# Patient Record
Sex: FEMALE | Race: WHITE | Employment: FULL TIME | URBAN - NONMETROPOLITAN AREA
[De-identification: names, ages, dates, MRNs, and addresses within clinical notes are randomized per-mention and may not be internally consistent; named-entity substitution may affect disease eponyms.]

---

## 2024-10-14 PROBLEM — Z85.828 HISTORY OF SKIN CANCER: Status: ACTIVE | Noted: 2024-10-14

## 2024-10-14 PROBLEM — R92.2 INCONCLUSIVE MAMMOGRAPHY DUE TO DENSE BREASTS: Status: ACTIVE | Noted: 2024-10-14

## 2024-10-14 PROBLEM — R92.30 INCONCLUSIVE MAMMOGRAPHY DUE TO DENSE BREASTS: Status: ACTIVE | Noted: 2024-10-14

## 2024-10-15 ENCOUNTER — HOSPITAL ENCOUNTER (OUTPATIENT)
Age: 44
Discharge: HOME | End: 2024-10-15
Payer: COMMERCIAL

## 2024-10-15 DIAGNOSIS — Z72.820 POOR SLEEP: ICD-10-CM

## 2024-10-15 DIAGNOSIS — Z00.00 WELL ADULT EXAM: ICD-10-CM

## 2024-10-15 LAB
25(OH)D3 SERPL-MCNC: 36 NG/ML (ref 30–100)
ALBUMIN SERPL BCG-MCNC: 4.3 G/DL (ref 3.5–5.1)
ALP SERPL-CCNC: 58 U/L (ref 38–126)
ALT SERPL W/O P-5'-P-CCNC: 10 U/L (ref 11–66)
ANION GAP SERPL CALC-SCNC: 12 MEQ/L (ref 8–16)
AST SERPL-CCNC: 14 U/L (ref 5–40)
BASOPHILS ABSOLUTE: 0.1 THOU/MM3 (ref 0–0.1)
BASOPHILS NFR BLD AUTO: 1 %
BILIRUB SERPL-MCNC: 0.4 MG/DL (ref 0.3–1.2)
BUN SERPL-MCNC: 9 MG/DL (ref 7–22)
CALCIUM SERPL-MCNC: 9.1 MG/DL (ref 8.5–10.5)
CHLORIDE SERPL-SCNC: 105 MEQ/L (ref 98–111)
CHOLEST SERPL-MCNC: 162 MG/DL (ref 100–199)
CO2 SERPL-SCNC: 23 MEQ/L (ref 23–33)
CREAT SERPL-MCNC: 0.5 MG/DL (ref 0.4–1.2)
DEPRECATED RDW RBC AUTO: 41.8 FL (ref 35–45)
EOSINOPHIL NFR BLD AUTO: 7.8 %
EOSINOPHILS ABSOLUTE: 0.5 THOU/MM3 (ref 0–0.4)
ERYTHROCYTE [DISTWIDTH] IN BLOOD BY AUTOMATED COUNT: 12.7 % (ref 11.5–14.5)
GFR SERPL CREATININE-BSD FRML MDRD: > 90 ML/MIN/1.73M2
GLUCOSE SERPL-MCNC: 86 MG/DL (ref 70–108)
HCT VFR BLD AUTO: 41.9 % (ref 37–47)
HDLC SERPL-MCNC: 53 MG/DL
HGB BLD-MCNC: 14 GM/DL (ref 12–16)
IMM GRANULOCYTES # BLD AUTO: 0.01 THOU/MM3 (ref 0–0.07)
IMM GRANULOCYTES NFR BLD AUTO: 0.2 %
IRON SERPL-MCNC: 127 UG/DL (ref 50–170)
LDLC SERPL CALC-MCNC: 96 MG/DL
LYMPHOCYTES ABSOLUTE: 1.6 THOU/MM3 (ref 1–4.8)
LYMPHOCYTES NFR BLD AUTO: 26.9 %
MCH RBC QN AUTO: 30.1 PG (ref 26–33)
MCHC RBC AUTO-ENTMCNC: 33.4 GM/DL (ref 32.2–35.5)
MCV RBC AUTO: 90.1 FL (ref 81–99)
MONOCYTES ABSOLUTE: 0.4 THOU/MM3 (ref 0.4–1.3)
MONOCYTES NFR BLD AUTO: 7.3 %
NEUTROPHILS ABSOLUTE: 3.4 THOU/MM3 (ref 1.8–7.7)
NEUTROPHILS NFR BLD AUTO: 56.8 %
NRBC BLD AUTO-RTO: 0 /100 WBC
PLATELET # BLD AUTO: 299 THOU/MM3 (ref 130–400)
PMV BLD AUTO: 9.3 FL (ref 9.4–12.4)
POTASSIUM SERPL-SCNC: 3.4 MEQ/L (ref 3.5–5.2)
PROT SERPL-MCNC: 7.7 G/DL (ref 6.1–8)
RBC # BLD AUTO: 4.65 MILL/MM3 (ref 4.2–5.4)
SODIUM SERPL-SCNC: 140 MEQ/L (ref 135–145)
TIBC SERPL-MCNC: 238 UG/DL (ref 171–450)
TRIGL SERPL-MCNC: 67 MG/DL (ref 0–199)
TSH SERPL DL<=0.005 MIU/L-ACNC: 1.55 UIU/ML (ref 0.4–4.2)
WBC # BLD AUTO: 5.9 THOU/MM3 (ref 4.8–10.8)

## 2024-10-15 PROCEDURE — 83540 ASSAY OF IRON: CPT

## 2024-10-15 PROCEDURE — 80061 LIPID PANEL: CPT

## 2024-10-15 PROCEDURE — 82306 VITAMIN D 25 HYDROXY: CPT

## 2024-10-15 PROCEDURE — 85025 COMPLETE CBC W/AUTO DIFF WBC: CPT

## 2024-10-15 PROCEDURE — 36415 COLL VENOUS BLD VENIPUNCTURE: CPT

## 2024-10-15 PROCEDURE — 80053 COMPREHEN METABOLIC PANEL: CPT

## 2024-10-15 PROCEDURE — 84443 ASSAY THYROID STIM HORMONE: CPT

## 2024-10-15 PROCEDURE — 83550 IRON BINDING TEST: CPT

## 2024-11-05 ENCOUNTER — HOSPITAL ENCOUNTER (OUTPATIENT)
Dept: WOMENS IMAGING | Age: 44
Discharge: HOME | End: 2024-11-05
Payer: COMMERCIAL

## 2024-11-05 DIAGNOSIS — R92.2 INCONCLUSIVE MAMMOGRAPHY DUE TO DENSE BREASTS: ICD-10-CM

## 2024-11-05 DIAGNOSIS — R92.30 INCONCLUSIVE MAMMOGRAPHY DUE TO DENSE BREASTS: ICD-10-CM

## 2024-11-05 PROCEDURE — 76641 ULTRASOUND BREAST COMPLETE: CPT

## 2024-11-06 ENCOUNTER — HOSPITAL ENCOUNTER (OUTPATIENT)
Dept: WOMENS IMAGING | Age: 44
Discharge: HOME | End: 2024-11-06
Attending: RADIOLOGY

## 2024-11-06 DIAGNOSIS — Z00.6 ENCOUNTER FOR EXAMINATION FOR NORMAL COMPARISON OR CONTROL IN CLINICAL RESEARCH PROGRAM: ICD-10-CM

## 2024-12-13 ENCOUNTER — APPOINTMENT (OUTPATIENT)
Dept: GENERAL RADIOLOGY | Age: 44
End: 2024-12-13
Attending: FAMILY MEDICINE
Payer: COMMERCIAL

## 2024-12-13 ENCOUNTER — HOSPITAL ENCOUNTER (EMERGENCY)
Age: 44
Discharge: HOME | End: 2024-12-13
Attending: FAMILY MEDICINE
Payer: COMMERCIAL

## 2024-12-13 VITALS
WEIGHT: 120 LBS | HEIGHT: 64 IN | SYSTOLIC BLOOD PRESSURE: 131 MMHG | BODY MASS INDEX: 20.49 KG/M2 | DIASTOLIC BLOOD PRESSURE: 90 MMHG | RESPIRATION RATE: 16 BRPM | OXYGEN SATURATION: 99 % | TEMPERATURE: 97.4 F | HEART RATE: 104 BPM

## 2024-12-13 DIAGNOSIS — M79.81 ACHENBACH'S SYNDROME: Primary | ICD-10-CM

## 2024-12-13 PROCEDURE — 99283 EMERGENCY DEPT VISIT LOW MDM: CPT

## 2024-12-13 PROCEDURE — 73130 X-RAY EXAM OF HAND: CPT

## 2024-12-13 ASSESSMENT — ENCOUNTER SYMPTOMS
VOMITING: 0
NAUSEA: 0

## 2024-12-13 ASSESSMENT — PAIN - FUNCTIONAL ASSESSMENT: PAIN_FUNCTIONAL_ASSESSMENT: 0-10

## 2024-12-13 ASSESSMENT — PAIN DESCRIPTION - LOCATION: LOCATION: HAND

## 2024-12-13 NOTE — ED PROVIDER NOTES
[facility]  eMERGENCY dEPARTMENT eNCOUnter          CHIEF COMPLAINT       Chief Complaint   Patient presents with    Finger bruising       Nurses Notes reviewed and I agree except as noted in the HPI.      HISTORY OF PRESENT ILLNESS    Ciara Patel is a 43 y.o. female who presents with right middle finger bruising. Patient notes symptoms have occurred before. Patient denies trauma. Denies pain. Notes prior occurrence has occurred before. Denies other hand or arm issues.          REVIEW OF SYSTEMS     Review of Systems   Cardiovascular:  Negative for chest pain and palpitations.   Gastrointestinal:  Negative for nausea and vomiting.   Skin:         Bruising noted to palmar aspect of right middle finger    Neurological:  Negative for weakness and numbness.   Hematological:  Does not bruise/bleed easily.         PAST MEDICAL HISTORY    has a past medical history of Skin cancer.    SURGICAL HISTORY      has a past surgical history that includes Adenoidectomy w/Tympanostomy Tube Placem; Breast lumpectomy (Right, 2002); and Gum surgery (2022).    CURRENT MEDICATIONS       Previous Medications    CYCLOSPORINE (RESTASIS) 0.05 % OPHTHALMIC EMULSION    1 drop 2 times daily    LOW-OGESTREL 0.3-30 MG-MCG PER TABLET    Take 1 tablet by mouth daily       ALLERGIES     has No Known Allergies.    FAMILY HISTORY     She indicated that the status of her maternal aunt is unknown. She indicated that the status of her maternal cousin is unknown.   family history includes Breast Cancer (age of onset: 40 - 49) in her maternal cousin; Breast Cancer (age of onset: 70 - 79) in her maternal aunt.    SOCIAL HISTORY      reports that she has never smoked. She has never used smokeless tobacco. She reports current alcohol use. She reports that she does not use drugs.    PHYSICAL EXAM     INITIAL VITALS:  height is 1.626 m (5' 4\") and weight is 54.4 kg (120 lb). Her tympanic temperature is 97.4 °F (36.3 °C). Her blood

## 2024-12-13 NOTE — DISCHARGE INSTR - COC
Continuity of Care Form    Patient Name: Ciara Patel   :  1980  MRN:  [id number]    Admit date:  2024  Discharge date:  ***    Code Status Order: No Order   Advance Directives:   Advance Care Flowsheet Documentation             Admitting Physician:  No admitting provider for patient encounter.  PCP: Katie Tran, SIRIA - CNP    Discharging Nurse: ***  Discharging Hospital Unit/Room#: E1/E1  Discharging Unit Phone Number: ***    Emergency Contact:   Extended Emergency Contact Information  Primary Emergency Contact: JERO WEEMS  Home Phone: [phone]  Mobile Phone: [phone]  Relation: Parent    Past Surgical History:  Past Surgical History:   Procedure Laterality Date    ADENOIDECTOMY AND TYMPANOSTOMY TUBE PLACEMENT      as a child    BREAST LUMPECTOMY Right     fibroadenoma    GUM SURGERY      grafting       Immunization History:   Immunization History   Administered Date(s) Administered    COVID-19, MODERNA BLUE border, Primary or Immunocompromised, (age 12y+), IM, 100 mcg/0.5mL 2021, 2021    COVID-19, MODERNA Booster BLUE border, (age 18y+), IM, 50mcg/0.25mL 2021       Active Problems:  Patient Active Problem List   Diagnosis Code    History of skin cancer Z85.828    Inconclusive mammography due to dense breasts R92.2, R92.30       Isolation/Infection:   Isolation            No Isolation          Patient Infection Status       None to display            Nurse Assessment:  Last Vital Signs: BP (!) 131/90   Pulse (!) 104   Temp 97.4 °F (36.3 °C) (Tympanic)   Resp 16   Ht 1.626 m (5' 4\")   Wt 54.4 kg (120 lb)   LMP 2024   SpO2 99%   BMI 20.60 kg/m²     Last documented pain score (0-10 scale):    Last Weight:   Wt Readings from Last 1 Encounters:   24 54.4 kg (120 lb)     Mental Status:  {IP PT MENTAL STATUS:}    IV Access:  {[facility] IV ACCESS:209749457}    Nursing Mobility/ADLs:  Walking   {[facility] DME  applicable)   Name:  Address:  Dialysis Schedule:  Phone:  Fax:    / signature: {Esignature:018643680}    PHYSICIAN SECTION    Prognosis: {Prognosis:7241153705}    Condition at Discharge: { Patient Condition:719530646}    Rehab Potential (if transferring to Rehab): {Prognosis:2291895999}    Recommended Labs or Other Treatments After Discharge: ***    Physician Certification: I certify the above information and transfer of Ciara Patel  is necessary for the continuing treatment of the diagnosis listed and that she requires {Admit to Appropriate Level of Care:93020} for {GREATER/LESS:854945946} 30 days.     Update Admission H&P: {[facility] DME Changes in HandP:773741653}    PHYSICIAN SIGNATURE:  {Esignature:888615429}

## 2024-12-13 NOTE — DISCHARGE INSTRUCTIONS
Keep fingers warm. If worsening pain or other fingers become involved than return to ED or follow up with PCP.

## 2024-12-13 NOTE — ED NOTES
Pt complains of  r middle finger bruising. Pt denies any injury to finger. Pt states her finger will bruise and then heal up and then bruise again. Pt able to bend finger, radial pulse strong and regular. Pt alert, resp even and unlabored, skin pink, warm and dry.

## 2024-12-20 ENCOUNTER — HOSPITAL ENCOUNTER (OUTPATIENT)
Age: 44
End: 2024-12-20

## 2024-12-24 ENCOUNTER — HOSPITAL ENCOUNTER (OUTPATIENT)
Age: 44
Discharge: HOME | End: 2024-12-24
Payer: COMMERCIAL

## 2024-12-24 DIAGNOSIS — M79.81 ACHENBACH'S SYNDROME: ICD-10-CM

## 2024-12-24 DIAGNOSIS — L81.9 DISCOLORATION OF SKIN OF FINGER: ICD-10-CM

## 2024-12-24 LAB
FOLATE SERPL-MCNC: > 20 NG/ML (ref 4.8–24.2)
VIT B12 SERPL-MCNC: 590 PG/ML (ref 211–911)

## 2024-12-24 PROCEDURE — 82607 VITAMIN B-12: CPT

## 2024-12-24 PROCEDURE — 82746 ASSAY OF FOLIC ACID SERUM: CPT

## 2024-12-24 PROCEDURE — 36415 COLL VENOUS BLD VENIPUNCTURE: CPT

## 2024-12-24 PROCEDURE — 84207 ASSAY OF VITAMIN B-6: CPT

## 2024-12-27 LAB — PYRIDOXAL PHOS SERPL-SCNC: 73.8 NMOL/L (ref 20–125)

## 2025-03-18 ENCOUNTER — OFFICE VISIT (OUTPATIENT)
Age: 45
End: 2025-03-18
Payer: COMMERCIAL

## 2025-03-18 VITALS
DIASTOLIC BLOOD PRESSURE: 72 MMHG | HEIGHT: 65 IN | WEIGHT: 118 LBS | SYSTOLIC BLOOD PRESSURE: 113 MMHG | BODY MASS INDEX: 19.66 KG/M2 | HEART RATE: 80 BPM

## 2025-03-18 DIAGNOSIS — L81.9 DISCOLORATION OF SKIN OF FINGER: ICD-10-CM

## 2025-03-18 DIAGNOSIS — I73.00 RAYNAUD'S DISEASE WITHOUT GANGRENE: Primary | ICD-10-CM

## 2025-03-18 PROCEDURE — 99203 OFFICE O/P NEW LOW 30 MIN: CPT | Performed by: SURGERY

## 2025-03-18 RX ORDER — ASPIRIN 81 MG/1
81 TABLET ORAL DAILY
Qty: 90 TABLET | Refills: 0 | Status: SHIPPED | OUTPATIENT
Start: 2025-03-18

## 2025-03-18 ASSESSMENT — ENCOUNTER SYMPTOMS
CHEST TIGHTNESS: 0
SHORTNESS OF BREATH: 0
ABDOMINAL PAIN: 0
WHEEZING: 0

## 2025-03-18 NOTE — PROGRESS NOTES
duplex upper extremity arteries bilateral      2. Discoloration of skin of finger  aspirin 81 MG EC tablet          - Will obtain a bilateral upper extremity arterial doppler study with cold immersion testing. Will obtain an EKG and echocardiogram. She can start aspirin 81 mg PO daily; she does not need amlodipine or NTG paste at this point as she has no symptoms and her BP is normal. Discussed with the patient. I instructed her to keep her hands warm, although it does not necessarily appear that cold stimulated the incidents.  RTC after studies.      Christiano Ovalles MD   3/18/2025      Total time spent managing and seeing the patient was 35 minutes

## 2025-03-18 NOTE — PATIENT INSTRUCTIONS
If you receive a survey asking about your care experience, please respond. Your answers will help ensure you receive high-quality care at this office. Thank you!    Your Medical Assistant today: Romina JULIEN  Thank you for coming to our office! It was a pleasure to serve you.

## 2025-03-27 ENCOUNTER — HOSPITAL ENCOUNTER (OUTPATIENT)
Age: 45
Discharge: HOME | End: 2025-03-27
Payer: COMMERCIAL

## 2025-03-27 ENCOUNTER — HOSPITAL ENCOUNTER (OUTPATIENT)
Dept: GENERAL RADIOLOGY | Age: 45
Discharge: HOME | End: 2025-03-27
Payer: COMMERCIAL
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
## 2025-03-27 DIAGNOSIS — M25.551 RIGHT HIP PAIN: ICD-10-CM

## 2025-03-27 LAB
EKG ATRIAL RATE: 72 BPM
EKG P AXIS: 92 DEGREES
EKG P-R INTERVAL: 160 MS
EKG Q-T INTERVAL: 370 MS
EKG QRS DURATION: 82 MS
EKG QTC CALCULATION (BAZETT): 405 MS
EKG R AXIS: -7 DEGREES
EKG T AXIS: 42 DEGREES
EKG VENTRICULAR RATE: 72 BPM

## 2025-03-27 PROCEDURE — 93005 ELECTROCARDIOGRAM TRACING: CPT | Performed by: SURGERY

## 2025-03-27 PROCEDURE — 73502 X-RAY EXAM HIP UNI 2-3 VIEWS: CPT

## 2025-04-03 ENCOUNTER — HOSPITAL ENCOUNTER (OUTPATIENT)
Dept: INTERVENTIONAL RADIOLOGY/VASCULAR | Age: 45
Discharge: HOME | End: 2025-04-05
Attending: SURGERY
Payer: COMMERCIAL

## 2025-04-03 ENCOUNTER — HOSPITAL ENCOUNTER (OUTPATIENT)
Age: 45
Discharge: HOME | End: 2025-04-05
Attending: SURGERY
Payer: COMMERCIAL

## 2025-04-03 DIAGNOSIS — I73.00 RAYNAUD'S DISEASE WITHOUT GANGRENE: ICD-10-CM

## 2025-04-03 LAB
ECHO AO ASC DIAM: 2.5 CM
ECHO AR MAX VEL PISA: 2.8 M/S
ECHO AV CUSP MM: 1.9 CM
ECHO AV MEAN GRADIENT: 2 MMHG
ECHO AV MEAN VELOCITY: 0.7 M/S
ECHO AV PEAK GRADIENT: 4 MMHG
ECHO AV PEAK VELOCITY: 1.1 M/S
ECHO AV REGURGITANT PHT: 1104 MS
ECHO AV VELOCITY RATIO: 0.64
ECHO AV VTI: 19.4 CM
ECHO LA DIAMETER: 2 CM
ECHO LV E' LATERAL VELOCITY: 12.2 CM/S
ECHO LV E' SEPTAL VELOCITY: 8.6 CM/S
ECHO LV EDV A4C: 78 ML
ECHO LV EJECTION FRACTION 3D: 50 %
ECHO LV EJECTION FRACTION A4C: 49 %
ECHO LV ESV A4C: 40 ML
ECHO LV FRACTIONAL SHORTENING: 23 % (ref 28–44)
ECHO LV INTERNAL DIMENSION DIASTOLIC: 4 CM (ref 3.9–5.3)
ECHO LV INTERNAL DIMENSION SYSTOLIC: 3.1 CM
ECHO LV ISOVOLUMETRIC RELAXATION TIME (IVRT): 81 MS
ECHO LV IVSD: 0.9 CM (ref 0.6–0.9)
ECHO LV MASS 2D: 93.5 G (ref 67–162)
ECHO LV POSTERIOR WALL DIASTOLIC: 0.7 CM (ref 0.6–0.9)
ECHO LV RELATIVE WALL THICKNESS RATIO: 0.35
ECHO LVOT AV VTI INDEX: 0.59
ECHO LVOT MEAN GRADIENT: 1 MMHG
ECHO LVOT PEAK GRADIENT: 2 MMHG
ECHO LVOT PEAK VELOCITY: 0.7 M/S
ECHO LVOT VTI: 11.5 CM
ECHO MV A VELOCITY: 0.78 M/S
ECHO MV E DECELERATION TIME (DT): 198 MS
ECHO MV E VELOCITY: 0.56 M/S
ECHO MV E/A RATIO: 0.72
ECHO MV E/E' LATERAL: 4.59
ECHO MV E/E' RATIO (AVERAGED): 5.55
ECHO MV E/E' SEPTAL: 6.51
ECHO MV REGURGITANT PEAK GRADIENT: 71 MMHG
ECHO MV REGURGITANT PEAK VELOCITY: 4.2 M/S
ECHO PV MAX VELOCITY: 0.8 M/S
ECHO PV PEAK GRADIENT: 2 MMHG
ECHO RV INTERNAL DIMENSION: 2 CM
ECHO TV E WAVE: 0.5 M/S
ECHO TV REGURGITANT MAX VELOCITY: 1.79 M/S
ECHO TV REGURGITANT PEAK GRADIENT: 13 MMHG

## 2025-04-03 PROCEDURE — 93923 UPR/LXTR ART STDY 3+ LVLS: CPT

## 2025-04-03 PROCEDURE — 93306 TTE W/DOPPLER COMPLETE: CPT

## 2025-04-03 PROCEDURE — 93930 UPPER EXTREMITY STUDY: CPT

## 2025-04-29 ENCOUNTER — OFFICE VISIT (OUTPATIENT)
Age: 45
End: 2025-04-29
Payer: COMMERCIAL

## 2025-04-29 VITALS
BODY MASS INDEX: 19.68 KG/M2 | SYSTOLIC BLOOD PRESSURE: 131 MMHG | HEIGHT: 65 IN | HEART RATE: 83 BPM | DIASTOLIC BLOOD PRESSURE: 76 MMHG | WEIGHT: 118.1 LBS

## 2025-04-29 DIAGNOSIS — I73.00 RAYNAUD'S DISEASE WITHOUT GANGRENE: ICD-10-CM

## 2025-04-29 DIAGNOSIS — I45.3: ICD-10-CM

## 2025-04-29 DIAGNOSIS — I45.10 RIGHT BUNDLE BRANCH BLOCK: Primary | ICD-10-CM

## 2025-04-29 DIAGNOSIS — L81.9 DISCOLORATION OF SKIN OF FINGER: ICD-10-CM

## 2025-04-29 PROCEDURE — 99213 OFFICE O/P EST LOW 20 MIN: CPT | Performed by: SURGERY

## 2025-04-29 ASSESSMENT — ENCOUNTER SYMPTOMS
SHORTNESS OF BREATH: 0
ABDOMINAL PAIN: 0
RHINORRHEA: 0

## 2025-04-29 NOTE — PROGRESS NOTES
Chief Complaint   Patient presents with    Follow-up         HPI: Ciara Patel is an 44 y.o. female with possible Raynaud's disease. She has had no attacks since February. Unclear precipitating factors.     Arterial duplex of the arms shows no PAD or stenoses.     Cold immersion testing of the fingers and hands only affected the right 1-2 fingers, left 2nd finger. She is on aspirin.     EKG showed an incomplete RBBB. Echo was normal, EF 50% (improved compared to prior study).      Review of Systems   Constitutional:  Negative for fatigue.   HENT:  Negative for rhinorrhea.    Eyes:  Negative for visual disturbance.   Respiratory:  Negative for shortness of breath.    Cardiovascular:  Negative for chest pain.   Gastrointestinal:  Negative for abdominal pain.   Genitourinary:  Negative for dysuria.   Neurological:  Negative for speech difficulty and light-headedness.   Psychiatric/Behavioral:  Negative for behavioral problems and confusion.           Allergies: No Known Allergies      Current Outpatient Medications   Medication Sig Dispense Refill    cycloSPORINE (RESTASIS) 0.05 % ophthalmic emulsion 1 drop 2 times daily      LOW-OGESTREL 0.3-30 MG-MCG per tablet Take 1 tablet by mouth daily 1 packet 11    aspirin 81 MG EC tablet Take 1 tablet by mouth daily (Patient not taking: Reported on 4/29/2025) 90 tablet 0     No current facility-administered medications for this visit.         Patient Active Problem List   Diagnosis    History of skin cancer    Inconclusive mammography due to dense breasts        Past Medical History:   Diagnosis Date    Skin cancer 2022    x 3, basal cell        Past Surgical History:   Procedure Laterality Date    ADENOIDECTOMY AND TYMPANOSTOMY TUBE PLACEMENT      as a child    BREAST LUMPECTOMY Right 2002    fibroadenoma    GUM SURGERY  2022    grafting         Family History   Problem Relation Age of Onset    Breast Cancer Maternal Aunt 70 - 79    Breast Cancer Maternal Cousin 40 -

## 2025-05-21 NOTE — PATIENT INSTRUCTIONS
Your Provider for Today: Dr. Leon  Your nurses for today: Rebecca    You may receive a survey regarding the care you received during your visit.  Your input is valuable to us.  We encourage you to complete and return your survey.  We hope you will choose us in the future for your healthcare needs.

## 2025-05-28 ENCOUNTER — OFFICE VISIT (OUTPATIENT)
Dept: CARDIOLOGY CLINIC | Age: 45
End: 2025-05-28
Payer: COMMERCIAL

## 2025-05-28 VITALS
BODY MASS INDEX: 20.4 KG/M2 | HEART RATE: 80 BPM | HEIGHT: 64 IN | SYSTOLIC BLOOD PRESSURE: 110 MMHG | WEIGHT: 119.5 LBS | DIASTOLIC BLOOD PRESSURE: 78 MMHG

## 2025-05-28 DIAGNOSIS — R94.31 ABNORMAL EKG: Primary | ICD-10-CM

## 2025-05-28 PROCEDURE — 99204 OFFICE O/P NEW MOD 45 MIN: CPT | Performed by: INTERNAL MEDICINE

## 2025-05-28 NOTE — PROGRESS NOTES
New patient here for check up - RBB abnormal EKG    Pt continue with heart palpitations, notice daily,

## 2025-05-28 NOTE — PROGRESS NOTES
[facility] [place] [facility]  [facility] CARDIOLOGY  [address]  [facility] [address]  Dept: [phone]  Dept Fax: [phone]  Loc: [phone]    Visit Date: 5/28/2025    Chief Complaint   Patient presents with    Palpitations     New patient - RBB       HPI:   Ms. Patel is a 44 y.o. female  who presented for:  History of Present Illness  The patient is a 44-year-old female who presents for evaluation of right bundle branch block.    She was referred to our facility by Dr. De Los Santos, a vascular surgeon, due to an abnormal EKG indicative of a right bundle branch block. She has been experiencing daily palpitations since 2012, which were initially evaluated with a comprehensive cardiac workup, including an echocardiogram in 04/2012. Her treatment regimen at that time included a calcium blocker and a beta blocker.    She has Raynaud's phenomenon and saw Dr. De Los Santos for it. She reports no history of diabetes, lupus, rheumatoid arthritis, Sjogren's syndrome, or any other autoimmune conditions. She has been experiencing pain in her hand, but the vascular surgeon noted that the arthritis was not as severe as in other areas. She is considering the possibility of carpal tunnel syndrome. She does not recall discussing amlodipine with Dr. De Los Santos.      Current Outpatient Medications:     cycloSPORINE (RESTASIS) 0.05 % ophthalmic emulsion, 1 drop 2 times daily, Disp: , Rfl:     LOW-OGESTREL 0.3-30 MG-MCG per tablet, Take 1 tablet by mouth daily, Disp: 1 packet, Rfl: 11  Past Medical History   has a past medical history of Skin cancer.    Social History  Ciara  reports that she has never smoked. She has never used smokeless tobacco. She reports current alcohol use. She reports that she does not use drugs.    Family History  Ciara family history includes Breast Cancer (age of onset: 40 - 49) in her maternal cousin; Breast Cancer (age of onset: 70 - 79) in her maternal aunt.    Past Surgical History